# Patient Record
Sex: FEMALE | Race: WHITE | NOT HISPANIC OR LATINO | URBAN - METROPOLITAN AREA
[De-identification: names, ages, dates, MRNs, and addresses within clinical notes are randomized per-mention and may not be internally consistent; named-entity substitution may affect disease eponyms.]

---

## 2017-03-28 ENCOUNTER — OFFICE VISIT - HEALTHEAST (OUTPATIENT)
Dept: INTERNAL MEDICINE | Facility: CLINIC | Age: 32
End: 2017-03-28

## 2017-03-28 DIAGNOSIS — Z00.00 ANNUAL PHYSICAL EXAM: ICD-10-CM

## 2017-03-28 DIAGNOSIS — K58.9 IRRITABLE BOWEL SYNDROME (IBS): ICD-10-CM

## 2017-03-28 ASSESSMENT — MIFFLIN-ST. JEOR: SCORE: 1302.66

## 2017-06-22 ENCOUNTER — COMMUNICATION - HEALTHEAST (OUTPATIENT)
Dept: INTERNAL MEDICINE | Facility: CLINIC | Age: 32
End: 2017-06-22

## 2017-06-22 DIAGNOSIS — F32.A DEPRESSION: ICD-10-CM

## 2018-04-25 ENCOUNTER — OFFICE VISIT - HEALTHEAST (OUTPATIENT)
Dept: INTERNAL MEDICINE | Facility: CLINIC | Age: 33
End: 2018-04-25

## 2018-04-25 DIAGNOSIS — F41.1 GAD (GENERALIZED ANXIETY DISORDER): ICD-10-CM

## 2018-04-25 ASSESSMENT — MIFFLIN-ST. JEOR: SCORE: 1293.59

## 2018-06-08 ENCOUNTER — OFFICE VISIT - HEALTHEAST (OUTPATIENT)
Dept: INTERNAL MEDICINE | Facility: CLINIC | Age: 33
End: 2018-06-08

## 2018-06-08 ENCOUNTER — AMBULATORY - HEALTHEAST (OUTPATIENT)
Dept: INTERNAL MEDICINE | Facility: CLINIC | Age: 33
End: 2018-06-08

## 2018-06-08 DIAGNOSIS — F41.1 GAD (GENERALIZED ANXIETY DISORDER): ICD-10-CM

## 2018-06-08 ASSESSMENT — MIFFLIN-ST. JEOR: SCORE: 1293.59

## 2018-08-03 ENCOUNTER — OFFICE VISIT - HEALTHEAST (OUTPATIENT)
Dept: INTERNAL MEDICINE | Facility: CLINIC | Age: 33
End: 2018-08-03

## 2018-08-03 DIAGNOSIS — M76.892 HAMSTRING TENDINITIS OF LEFT THIGH: ICD-10-CM

## 2018-08-07 ENCOUNTER — OFFICE VISIT - HEALTHEAST (OUTPATIENT)
Dept: INTERNAL MEDICINE | Facility: CLINIC | Age: 33
End: 2018-08-07

## 2018-08-07 DIAGNOSIS — Z34.91 INTRAUTERINE NORMAL PREGNANCY, FIRST TRIMESTER: ICD-10-CM

## 2018-08-07 DIAGNOSIS — R19.7 DIARRHEA, UNSPECIFIED TYPE: ICD-10-CM

## 2018-08-07 ASSESSMENT — MIFFLIN-ST. JEOR: SCORE: 1289.05

## 2018-08-08 LAB
BASOPHILS # BLD AUTO: 0.1 THOU/UL (ref 0–0.2)
BASOPHILS NFR BLD AUTO: 1 % (ref 0–2)
EOSINOPHIL # BLD AUTO: 0.2 THOU/UL (ref 0–0.4)
EOSINOPHIL NFR BLD AUTO: 2 % (ref 0–6)
ERYTHROCYTE [DISTWIDTH] IN BLOOD BY AUTOMATED COUNT: 11.3 % (ref 11–14.5)
HCT VFR BLD AUTO: 39.5 % (ref 35–47)
HGB BLD-MCNC: 13.2 G/DL (ref 12–16)
LYMPHOCYTES # BLD AUTO: 1.9 THOU/UL (ref 0.8–4.4)
LYMPHOCYTES NFR BLD AUTO: 23 % (ref 20–40)
MCH RBC QN AUTO: 31.8 PG (ref 27–34)
MCHC RBC AUTO-ENTMCNC: 33.4 G/DL (ref 32–36)
MCV RBC AUTO: 95 FL (ref 80–100)
MONOCYTES # BLD AUTO: 0.8 THOU/UL (ref 0–0.9)
MONOCYTES NFR BLD AUTO: 9 % (ref 2–10)
NEUTROPHILS # BLD AUTO: 5.3 THOU/UL (ref 2–7.7)
NEUTROPHILS NFR BLD AUTO: 65 % (ref 50–70)
PLATELET # BLD AUTO: 268 THOU/UL (ref 140–440)
PMV BLD AUTO: 7 FL (ref 7–10)
RBC # BLD AUTO: 4.15 MILL/UL (ref 3.8–5.4)
WBC: 8.2 THOU/UL (ref 4–11)

## 2018-08-10 ENCOUNTER — COMMUNICATION - HEALTHEAST (OUTPATIENT)
Dept: INTERNAL MEDICINE | Facility: CLINIC | Age: 33
End: 2018-08-10

## 2018-08-10 ENCOUNTER — AMBULATORY - HEALTHEAST (OUTPATIENT)
Dept: INTERNAL MEDICINE | Facility: CLINIC | Age: 33
End: 2018-08-10

## 2018-08-13 ENCOUNTER — THERAPY VISIT (OUTPATIENT)
Dept: PHYSICAL THERAPY | Facility: CLINIC | Age: 33
End: 2018-08-13
Payer: COMMERCIAL

## 2018-08-13 DIAGNOSIS — M25.562 CHRONIC PAIN OF LEFT KNEE: Primary | ICD-10-CM

## 2018-08-13 DIAGNOSIS — G89.29 CHRONIC PAIN OF LEFT KNEE: Primary | ICD-10-CM

## 2018-08-13 PROCEDURE — 97110 THERAPEUTIC EXERCISES: CPT | Mod: GP | Performed by: PHYSICAL THERAPIST

## 2018-08-13 PROCEDURE — 97161 PT EVAL LOW COMPLEX 20 MIN: CPT | Mod: GP | Performed by: PHYSICAL THERAPIST

## 2018-08-13 PROCEDURE — 97530 THERAPEUTIC ACTIVITIES: CPT | Mod: GP | Performed by: PHYSICAL THERAPIST

## 2018-08-13 ASSESSMENT — ACTIVITIES OF DAILY LIVING (ADL)
PAIN: THE SYMPTOM AFFECTS MY ACTIVITY SLIGHTLY
HOW_WOULD_YOU_RATE_THE_CURRENT_FUNCTION_OF_YOUR_KNEE_DURING_YOUR_USUAL_DAILY_ACTIVITIES_ON_A_SCALE_FROM_0_TO_100_WITH_100_BEING_YOUR_LEVEL_OF_KNEE_FUNCTION_PRIOR_TO_YOUR_INJURY_AND_0_BEING_THE_INABILITY_TO_PERFORM_ANY_OF_YOUR_USUAL_DAILY_ACTIVITIES?: 50
LIMPING: I DO NOT HAVE THE SYMPTOM
STAND: ACTIVITY IS NOT DIFFICULT
GIVING WAY, BUCKLING OR SHIFTING OF KNEE: I DO NOT HAVE THE SYMPTOM
HOW_WOULD_YOU_RATE_THE_OVERALL_FUNCTION_OF_YOUR_KNEE_DURING_YOUR_USUAL_DAILY_ACTIVITIES?: ABNORMAL
SIT WITH YOUR KNEE BENT: I AM UNABLE TO DO THE ACTIVITY
KNEE_ACTIVITY_OF_DAILY_LIVING_SUM: 55
GO DOWN STAIRS: ACTIVITY IS MINIMALLY DIFFICULT
GO UP STAIRS: ACTIVITY IS MINIMALLY DIFFICULT
KNEEL ON THE FRONT OF YOUR KNEE: ACTIVITY IS SOMEWHAT DIFFICULT
WALK: ACTIVITY IS NOT DIFFICULT
STIFFNESS: THE SYMPTOM AFFECTS MY ACTIVITY SLIGHTLY
RAW_SCORE: 55
SQUAT: ACTIVITY IS MINIMALLY DIFFICULT
WEAKNESS: I DO NOT HAVE THE SYMPTOM
RISE FROM A CHAIR: ACTIVITY IS NOT DIFFICULT
KNEE_ACTIVITY_OF_DAILY_LIVING_SCORE: 78.57
SWELLING: I HAVE THE SYMPTOM BUT IT DOES NOT AFFECT MY ACTIVITY
AS_A_RESULT_OF_YOUR_KNEE_INJURY,_HOW_WOULD_YOU_RATE_YOUR_CURRENT_LEVEL_OF_DAILY_ACTIVITY?: ABNORMAL

## 2018-08-13 NOTE — PROGRESS NOTES
[facility] Initial Evaluation  Physical Therapy Initial Examination/Evaluation    August 13, 2018    Adrienne Portillo  is a 33 year old  female referred to physical therapy by Dr. Valentin for treatment of hamstring tendonitis.      DOI/onset 6 months ago  Mechanism of injury chronic changes. I believe it is a running injury. The pain was manageable at first but in the past couple months it has progressively worsened to the point where I stopped running   DOS none  Prior treatment ice. Effect of prior treatment fair    Chief Complaint:  Knee pain .   Pain location: posterior knee, anytime I bend my knee   Quality: achy overall, with  occasional sharpness   Constant/Intermittent: intermittent  Symptoms have increased since onset.    Time of Day: non-dependent   Current pain 3/10.  Pain at best 2/10.  Pain at worst 7/10.    Symptoms aggravated by running, sitting cross legged, stairs   Symptoms improved with rest     Social history:  Runner,  Enjoys spin class and other fitness classes. Regularly attends the gym, cardio and strength training.   with 2 step kids.  Occupation: .  Job duties:  Prolonged sitting, computer work.    Patient having difficulty with ADLs: sitting, walking stairs    Patient's goals are return to running     Patient reports general health as excellent.    Related medical history currently pregnant, 10 weeks, first pregnancy   Surgical History:  3 L ankle surgeries .    Imaging: none  Medications:  Anti-depressants.       Outcome measure:  KOS  Return to MD:  As needed.      Clinical Impression: Adrienne presents to [facility] with primary complains of L knee pain. Per clinical examination findings consistent with hamstring tendonopathy.  Pt demonstrates decreased ROM and muscular strength, impaired balance with increased pain through the posterior knee.  Patient responded well to eccentric exercises today in clinic.  To continue per plan of care outlined  below.      Observation:  - L foot held anterior   - slight flexion of L LE  - equal Iliac crest height   - pes planus R>L   - (+) Trendelenburg R>L    Squat:  - weight shift to the R  SL squat:  - Moderate femoral add/IR,  R>L  - patient reports no pain    MMT:  Hip flexion: 5/5 B  Knee extension: 5/5 B  Knee flexion: L 4+/5 R 5/5   ER hip: 4+/5 B  IR hip: 5-/5 L, 5/5 R  Dorsiflexion: 5/5 B    ROM:  Knee extension: 2 degrees R, 1 degrees L   Knee flexion: 137 degrees R, 140 degrees L  L knee flexion pain behind the posterior aspect of the knee -pt reports it feels like it's being compressed    Ankle Dorsiflexion: 90 degrees R, 85 degrees L  Ankle Plantarflexion: 80 degrees R, 78 degrees L     Special Tests:  (+) mcMurrys L   (- ) hip screen   (-) FADIR, passive hip flexion, SLR, JO-ANN (reproduced her knee pain)      Palpation: Mod-max tenderness distal HS on the L       HPI                    Objective:  System    Physical Exam    General     ROS    Assessment/Plan:    Patient is a 33 year old female with left side knee complaints.    Patient has the following significant findings with corresponding treatment plan.                Diagnosis 1:  L knee pain  Pain -  hot/cold therapy, US, electric stimulation, manual therapy, splint/taping/bracing/orthotics, self management, education and home program  Decreased ROM/flexibility - manual therapy and therapeutic exercise  Decreased joint mobility - manual therapy and therapeutic exercise  Decreased strength - therapeutic exercise and therapeutic activities  Impaired balance - neuro re-education and therapeutic activities  Impaired muscle performance - neuro re-education  Decreased function - therapeutic activities    Therapy Evaluation Codes:   1) History comprised of:   Personal factors that impact the plan of care:      None.    Comorbidity factors that impact the plan of care are:      Pregnancy.     Medications impacting care: Anti-depressant.  2) Examination of  Body Systems comprised of:   Body structures and functions that impact the plan of care:      Ankle and Knee.   Activity limitations that impact the plan of care are:      Bending, Jumping, Running, Sitting, Squatting/kneeling, Stairs and Walking.  3) Clinical presentation characteristics are:   Evolving/Changing.  4) Decision-Making    Low complexity using standardized patient assessment instrument and/or measureable assessment of functional outcome.  Cumulative Therapy Evaluation is: Low complexity.    Previous and current functional limitations:  (See Goal Flow Sheet for this information)    Short term and Long term goals: (See Goal Flow Sheet for this information)     Communication ability:  Patient appears to be able to clearly communicate and understand verbal and written communication and follow directions correctly.  Treatment Explanation - The following has been discussed with the patient:   RX ordered/plan of care  Anticipated outcomes  Possible risks and side effects  This patient would benefit from PT intervention to resume normal activities.   Rehab potential is good.    Frequency:  1 X week, once daily  Duration:  for 12 weeks  Discharge Plan:  Achieve all LTG.  Independent in home treatment program.  Reach maximal therapeutic benefit.    Please refer to the daily flowsheet for treatment today, total treatment time and time spent performing 1:1 timed codes.

## 2018-08-13 NOTE — LETTER
[facility] PHYSICAL THERAPY   [address] [address]  [phone]    2018    Re: Adrienne Portillo   :   1985  MRN:  [id number]   REFERRING PHYSICIAN:   Hank Valentin    [facility] PHYSICAL THERAPY  Date of Initial Evaluation:  2018  Visits:  Rxs Used: 1  Reason for Referral:  Chronic pain of left knee    EVALUATION SUMMARY    [facility]  Physical Therapy Initial Examination/Evaluation    2018    Adrienne Portillo  is a 33 year old  female referred to physical therapy by Dr. Valentin for treatment of hamstring tendonitis.    DOI/onset 6 months ago  Mechanism of injury chronic changes. I believe it is a running injury. The pain was manageable at first but in the past couple months it has progressively worsened to the point where I stopped running   DOS none  Prior treatment ice. Effect of prior treatment fair  Chief Complaint:  Knee pain .   Pain location: posterior knee, anytime I bend my knee   Quality: achy overall, with  occasional sharpness   Constant/Intermittent: intermittent  Symptoms have increased since onset.    Time of Day: non-dependent   Current pain 3/10.  Pain at best 2/10.  Pain at worst 7/10.    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 Regularly attends the gym, cardio and strength training.   with 2 step kids.  Occupation: .  Job duties:  Prolonged sitting, computer work.    Patient having difficulty with ADLs: sitting, walking stairs    Patient's goals are return to running  Patient reports general health as excellent.    Related medical history currently pregnant, 10 weeks, first pregnancy   Surgical History:  3 L ankle surgeries .    Imaging: none  Medications:  Anti-depressants.    Outcome measure:   KOS  Return to MD:  As needed.    Clinical Impression: Adrienne presents to [facility] with primary complains of L knee pain. Per clinical examination findings consistent with hamstring tendonopathy.  Pt demonstrates decreased ROM and muscular strength, impaired balance with increased pain through the posterior knee.  Patient responded well to eccentric exercises today in clinic.  To continue per plan of care outlined below.      Observation:  - L foot held anterior   - slight flexion of L LE  - equal Iliac crest height   - pes planus R>L   - (+) Trendelenburg R>L      Re: Adrienne Portillo   :   1985  MRN:  [id number]  Squat:  - weight shift to the R  SL squat:  - Moderate femoral add/IR,  R>L  - patient reports no pain  MMT:  Hip flexion: 5/5 B  Knee extension: 5/5 B  Knee flexion: L 4+/5 R 5/5   ER hip: 4+/5 B  IR hip: 5-/5 L, 5/5 R  Dorsiflexion: 5/5 B  ROM:  Knee extension: 2 degrees R, 1 degrees L   Knee flexion: 137 degrees R, 140 degrees L  L knee flexion pain behind the posterior aspect of the knee -pt reports it feels like it's being compressed  Ankle Dorsiflexion: 90 degrees R, 85 degrees L  Ankle Plantarflexion: 80 degrees R, 78 degrees L     Special Tests:  (+) mcMurrys L   (- ) hip screen   (-) FADIR, passive hip flexion, SLR, JO-ANN (reproduced her knee pain)  Palpation: Mod-max tenderness distal HS on the L     Assessment/Plan:    Patient is a 33 year old female with left side knee complaints.    Patient has the following significant findings with corresponding treatment plan.                Diagnosis 1:  L knee pain  Pain -  hot/cold therapy, US, electric stimulation, manual therapy, splint/taping/bracing/orthotics, self management, education and home program  Decreased ROM/flexibility - manual therapy and therapeutic exercise  Decreased joint mobility - manual therapy and therapeutic exercise  Decreased strength - therapeutic exercise and therapeutic activities  Impaired balance - neuro  re-education and therapeutic activities  Impaired muscle performance - neuro re-education  Decreased function - therapeutic activities    Therapy Evaluation Codes:   1) History comprised of:   Personal factors that impact the plan of care:      None.    Comorbidity factors that impact the plan of care are:      Pregnancy.     Medications impacting care: Anti-depressant.  2) Examination of Body Systems comprised of:   Body structures and functions that impact the plan of care:      Ankle and Knee.   Activity limitations that impact the plan of care are:      Bending, Jumping, Running, Sitting, Squatting/kneeling, Stairs and Walking.  3) Clinical presentation characteristics are:   Evolving/Changing.  4) Decision-Making    Low complexity using standardized patient assessment instrument and/or measureable assessment of functional outcome.  Cumulative Therapy Evaluation is: Low complexity.    Previous and current functional limitations:  (See Goal Flow Sheet for this information)    Short term and Long term goals: (See Goal Flow Sheet for this information)     Communication ability:  Patient appears to be able to clearly communicate and understand verbal and written communication and follow directions correctly.  Treatment Explanation - The following has been discussed with the patient:     Re: Adrienne LISANDRO Bandar   :   1985  MRN:  [id number]    RX ordered/plan of care  Anticipated outcomes  Possible risks and side effects  This patient would benefit from PT intervention to resume normal activities.   Rehab potential is good.    Frequency:  1 X week, once daily  Duration:  for 12 weeks  Discharge Plan:  Achieve all LTG.  Independent in home treatment program.  Reach maximal therapeutic benefit.    Thank you for your referral.    INQUIRIES  Therapist: Yu Bonilla, PT, DPT, [facility]  [facility] PHYSICAL THERAP[facility]  [address] [place] [id number]  Phone: [phone]  Fax:  [phone]

## 2018-08-13 NOTE — MR AVS SNAPSHOT
After Visit Summary   8/13/2018    Adrienne Portillo    MRN: [id number]           Patient Information     Date Of Birth          1985        Visit Information        Provider Department      8/13/2018 8:10 AM Yu Bonilla, PT [facility] Physical [facility]        Today's Diagnoses     Chronic pain of left knee    -  1       Follow-ups after your visit        Your next 10 appointments already scheduled     Aug 20, 2018  3:20 PM CDT   SHANNON Extremity with Fely Salas, PTA   [facility] Physical Therapy ([facility]  )    [address] [place] [address] [phone]            Aug 28, 2018  4:00 PM CDT   SHANNON Extremity with Fidelina Tom, PT   [facility] Physical [facility] ([facility]  )    [address] [place] [address] [phone]              Who to contact     If you have questions or need follow up information about today's clinic visit or your schedule please contact [facility] PHYSICAL THERAPY directly at [phone].  Normal or non-critical lab and imaging results will be communicated to you by MyChart, letter or phone within 4 business days after the clinic has received the results. If you do not hear from us within 7 days, please contact the clinic through MyChart or phone. If you have a critical or abnormal lab result, we will notify you by phone as soon as possible.  Submit refill requests through [employer] or call your pharmacy and they will forward the refill request to us. Please allow 3 business days for your refill to be completed.          Additional Information About Your Visit        Care EveryWhere ID     This is your Care EveryWhere ID. This could be used by other organizations to access your [facility] medical records  [id number]         Blood Pressure from Last 3 Encounters:   08/18/13 106/64   08/03/13  118/60   03/04/12 112/60    Weight from Last 3 Encounters:   08/18/13 61.2 kg (135 lb)   08/03/13 61.2 kg (135 lb)   03/04/12 61.2 kg (135 lb)              We Performed the Following     SHANNON Inital Eval Report     PT Eval, Low Complexity (42622)     Therapeutic Activities     Therapeutic Exercises        Primary Care Provider Office Phone # Fax #    Dada Gaitan [phone]       [address]  [facility] [address]        Equal Access to Services     [facility]: Hadii aad ku hadasho Soomaali, waaxda luqadaha, qaybta kaalmada adeamarjityawendi, stevenson antonio . So [facility] [phone].    ATENCIÓN: Si habla español, tiene a arias disposición servicios gratuitos de asistencia lingüística. Ll[facility] [phone].    We comply with applicable federal civil rights laws and [place] laws. We do not discriminate on the basis of race, color, national origin, age, disability, sex, sexual orientation, or gender identity.            Thank you!     Thank you for choosing [place] [facility] PHYSICAL THERAPY  for your care. Our goal is always to provide you with excellent care. Hearing back from our patients is one way we can continue to improve our services. Please take a few minutes to complete the written survey that you may receive in the mail after your visit with us. Thank you!             Your Updated Medication List - Protect others around you: Learn how to safely use, store and throw away your medicines at www.disposemymeds.org.          This list is accurate as of 8/13/18  2:35 PM.  Always use your most recent med list.                   Brand Name Dispense Instructions for use Diagnosis    PAXIL PO      Take  by mouth.        PERCY 3-0.02 MG per tablet   Generic drug:  drospirenone-ethinyl estradiol      Take 1 tablet by mouth daily.

## 2018-08-21 ENCOUNTER — THERAPY VISIT (OUTPATIENT)
Dept: PHYSICAL THERAPY | Facility: CLINIC | Age: 33
End: 2018-08-21
Payer: COMMERCIAL

## 2018-08-21 DIAGNOSIS — M25.562 CHRONIC PAIN OF LEFT KNEE: ICD-10-CM

## 2018-08-21 DIAGNOSIS — G89.29 CHRONIC PAIN OF LEFT KNEE: ICD-10-CM

## 2018-08-21 PROCEDURE — 97140 MANUAL THERAPY 1/> REGIONS: CPT | Mod: GP | Performed by: PHYSICAL THERAPIST

## 2018-08-21 PROCEDURE — 97112 NEUROMUSCULAR REEDUCATION: CPT | Mod: GP | Performed by: PHYSICAL THERAPIST

## 2018-08-28 ENCOUNTER — THERAPY VISIT (OUTPATIENT)
Dept: PHYSICAL THERAPY | Facility: CLINIC | Age: 33
End: 2018-08-28
Payer: COMMERCIAL

## 2018-08-28 DIAGNOSIS — M25.562 CHRONIC PAIN OF LEFT KNEE: ICD-10-CM

## 2018-08-28 DIAGNOSIS — G89.29 CHRONIC PAIN OF LEFT KNEE: ICD-10-CM

## 2018-08-28 PROCEDURE — 97110 THERAPEUTIC EXERCISES: CPT | Mod: GP | Performed by: PHYSICAL THERAPIST

## 2018-08-28 PROCEDURE — 97140 MANUAL THERAPY 1/> REGIONS: CPT | Mod: GP | Performed by: PHYSICAL THERAPIST

## 2018-08-28 PROCEDURE — 97112 NEUROMUSCULAR REEDUCATION: CPT | Mod: GP | Performed by: PHYSICAL THERAPIST

## 2018-08-28 NOTE — PROGRESS NOTES
Subjective:  HPI                    Objective:  System    Physical Exam    General     ROS    Assessment/Plan:    SUBJECTIVE  Subjective changes as noted by pt:   Pt. reports steady progress with decreasing L knee pain. She feels like she is ready to start testing running again in the next week.   Current pain level:  2/10   Changes in function:  Yes (See Goal flowsheet attached for changes in current functional level)     Adverse reaction to treatment or activity:  None    OBJECTIVE  Changes in objective findings:   Strength L hip flex 5/5; abd 4/5; ext 4+/5; glut med 4+/5     ASSESSMENT  Adrienne continues to require intervention to meet STG and LTG's: PT  Patient's symptoms are resolving.  Response to therapy has shown an improvement in  pain level and strength  Progress made towards STG/LTG?  Yes (See Goal flowsheet attached for updates on achievement of STG and LTG)    PLAN  Current treatment program is being advanced to more complex exercises.    PTA/ATC plan:  N/A    Please refer to the daily flowsheet for treatment today, total treatment time and time spent performing 1:1 timed codes.

## 2018-08-28 NOTE — MR AVS SNAPSHOT
After Visit Summary   8/28/2018    Adrienne Portillo    MRN: [id number]           Patient Information     Date Of Birth          1985        Visit Information        Provider Department      8/28/2018 4:00 PM Fidelina Tom, PT [facility] Physical Therapy        Today's Diagnoses     Chronic pain of left knee           Follow-ups after your visit        Your next 10 appointments already scheduled     Sep 10, 2018  7:30 AM CDT   SHANNON Extremity with Yu Bonilla PT   [facility] Physical Therapy ([facility]  )    [address] [place] [address] [phone]            Sep 17, 2018  7:30 AM CDT   SHANNON Extremity with Yu Bonilla PT   [facility] Physical Therapy ([address] [address] [phone]              Who to contact     If you have questions or need follow up information about today's clinic visit or your schedule please contact [facility] PHYSICAL THERAPY directly at [phone].  Normal or non-critical lab and imaging results will be communicated to you by MyChart, letter or phone within 4 business days after the clinic has received the results. If you do not hear from us within 7 days, please contact the clinic through MyChart or phone. If you have a critical or abnormal lab result, we will notify you by phone as soon as possible.  Submit refill requests through [employer] or call your pharmacy and they will forward the refill request to us. Please allow 3 business days for your refill to be completed.          Additional Information About Your Visit        Care EveryWhere ID     This is your Care EveryWhere ID. This could be used by other organizations to access your [facility] [place] medical records  [id number]         Blood Pressure from Last 3 Encounters:   08/18/13 106/64   08/03/13 118/60    03/04/12 112/60    Weight from Last 3 Encounters:   08/18/13 61.2 kg (135 lb)   08/03/13 61.2 kg (135 lb)   03/04/12 61.2 kg (135 lb)              We Performed the Following     Manual Ther Tech, 1+Regions, EA 15 min     Neuromuscular Re-Education     Therapeutic Exercises        Primary Care Provider Office Phone # Fax #    Dada Gaitan [phone]       [facility] [address] [facility] [address]  [facility] [address]        Equal Access to Services     [facility]: Hadii aad ku hadasho Soomaali, waaxda luqadaha, qaybta kaalmada adeegyada, waxay jacintoin hayjose antonio . So [facility] [phone].    ATENCIÓN: Si habla español, tiene a arias disposición servicios gratuitos de asistencia lingüística. LlAs[facility]ab[facility] [phone].    We comply with applicable federal civil rights laws and [place] laws. We do not discriminate on the basis of race, color, national origin, age, disability, sex, sexual orientation, or gender identity.            Thank you!     Thank you for choosing [place] [facility]IC [facility]IC[facility] PHYSICAL THERAPY  for your care. Our goal is always to provide you with excellent care. Hearing back from our patients is one way we can continue to improve our services. Please take a few minutes to complete the written survey that you may receive in the mail after your visit with us. Thank you!             Your Updated Medication List - Protect others around you: Learn how to safely use, store and throw away your medicines at www.disposemymeds.org.          This list is accurate as of 8/28/18  5:57 PM.  Always use your most recent med list.                   Brand Name Dispense Instructions for use Diagnosis    PAXIL PO      Take  by mouth.        PERCY 3-0.02 MG per tablet   Generic drug:  drospirenone-ethinyl estradiol      Take 1 tablet by mouth daily.

## 2018-09-10 ENCOUNTER — THERAPY VISIT (OUTPATIENT)
Dept: PHYSICAL THERAPY | Facility: CLINIC | Age: 33
End: 2018-09-10
Payer: COMMERCIAL

## 2018-09-10 DIAGNOSIS — M25.562 CHRONIC PAIN OF LEFT KNEE: ICD-10-CM

## 2018-09-10 DIAGNOSIS — G89.29 CHRONIC PAIN OF LEFT KNEE: ICD-10-CM

## 2018-09-10 PROCEDURE — 97140 MANUAL THERAPY 1/> REGIONS: CPT | Mod: GP | Performed by: PHYSICAL THERAPIST

## 2018-09-10 PROCEDURE — 97530 THERAPEUTIC ACTIVITIES: CPT | Mod: GP | Performed by: PHYSICAL THERAPIST

## 2018-09-10 PROCEDURE — 97110 THERAPEUTIC EXERCISES: CPT | Mod: GP | Performed by: PHYSICAL THERAPIST

## 2018-09-17 ENCOUNTER — THERAPY VISIT (OUTPATIENT)
Dept: PHYSICAL THERAPY | Facility: CLINIC | Age: 33
End: 2018-09-17
Payer: COMMERCIAL

## 2018-09-17 DIAGNOSIS — G89.29 CHRONIC PAIN OF LEFT KNEE: ICD-10-CM

## 2018-09-17 DIAGNOSIS — M25.562 CHRONIC PAIN OF LEFT KNEE: ICD-10-CM

## 2018-09-17 PROCEDURE — 97140 MANUAL THERAPY 1/> REGIONS: CPT | Mod: GP | Performed by: PHYSICAL THERAPIST

## 2018-09-17 PROCEDURE — 97110 THERAPEUTIC EXERCISES: CPT | Mod: GP | Performed by: PHYSICAL THERAPIST

## 2018-10-04 ENCOUNTER — THERAPY VISIT (OUTPATIENT)
Dept: PHYSICAL THERAPY | Facility: CLINIC | Age: 33
End: 2018-10-04
Payer: COMMERCIAL

## 2018-10-04 DIAGNOSIS — G89.29 CHRONIC PAIN OF LEFT KNEE: ICD-10-CM

## 2018-10-04 DIAGNOSIS — M25.562 CHRONIC PAIN OF LEFT KNEE: ICD-10-CM

## 2018-10-04 PROCEDURE — 97110 THERAPEUTIC EXERCISES: CPT | Mod: GP | Performed by: PHYSICAL THERAPIST

## 2018-10-04 PROCEDURE — 97016 VASOPNEUMATIC DEVICE THERAPY: CPT | Mod: GP | Performed by: PHYSICAL THERAPIST

## 2018-10-04 PROCEDURE — 97140 MANUAL THERAPY 1/> REGIONS: CPT | Mod: GP | Performed by: PHYSICAL THERAPIST

## 2018-10-04 ASSESSMENT — ACTIVITIES OF DAILY LIVING (ADL)
GIVING WAY, BUCKLING OR SHIFTING OF KNEE: I DO NOT HAVE THE SYMPTOM
WEAKNESS: I DO NOT HAVE THE SYMPTOM
PAIN: I HAVE THE SYMPTOM BUT IT DOES NOT AFFECT MY ACTIVITY
LIMPING: I DO NOT HAVE THE SYMPTOM
RISE FROM A CHAIR: ACTIVITY IS NOT DIFFICULT
SIT WITH YOUR KNEE BENT: ACTIVITY IS MINIMALLY DIFFICULT
STAND: ACTIVITY IS NOT DIFFICULT
WALK: ACTIVITY IS NOT DIFFICULT
RAW_SCORE: 67
KNEEL ON THE FRONT OF YOUR KNEE: ACTIVITY IS NOT DIFFICULT
SWELLING: I DO NOT HAVE THE SYMPTOM
HOW_WOULD_YOU_RATE_THE_CURRENT_FUNCTION_OF_YOUR_KNEE_DURING_YOUR_USUAL_DAILY_ACTIVITIES_ON_A_SCALE_FROM_0_TO_100_WITH_100_BEING_YOUR_LEVEL_OF_KNEE_FUNCTION_PRIOR_TO_YOUR_INJURY_AND_0_BEING_THE_INABILITY_TO_PERFORM_ANY_OF_YOUR_USUAL_DAILY_ACTIVITIES?: 90
KNEE_ACTIVITY_OF_DAILY_LIVING_SUM: 67
GO UP STAIRS: ACTIVITY IS NOT DIFFICULT
STIFFNESS: I HAVE THE SYMPTOM BUT IT DOES NOT AFFECT MY ACTIVITY
HOW_WOULD_YOU_RATE_THE_OVERALL_FUNCTION_OF_YOUR_KNEE_DURING_YOUR_USUAL_DAILY_ACTIVITIES?: NEARLY NORMAL
SQUAT: ACTIVITY IS NOT DIFFICULT
AS_A_RESULT_OF_YOUR_KNEE_INJURY,_HOW_WOULD_YOU_RATE_YOUR_CURRENT_LEVEL_OF_DAILY_ACTIVITY?: NEARLY NORMAL
GO DOWN STAIRS: ACTIVITY IS NOT DIFFICULT
KNEE_ACTIVITY_OF_DAILY_LIVING_SCORE: 95.71

## 2019-01-11 ENCOUNTER — RECORDS - HEALTHEAST (OUTPATIENT)
Dept: ADMINISTRATIVE | Facility: OTHER | Age: 34
End: 2019-01-11

## 2019-02-22 ENCOUNTER — HOME CARE/HOSPICE - HEALTHEAST (OUTPATIENT)
Dept: HOME HEALTH SERVICES | Facility: HOME HEALTH | Age: 34
End: 2019-02-22

## 2019-02-24 ENCOUNTER — HOME CARE/HOSPICE - HEALTHEAST (OUTPATIENT)
Dept: HOME HEALTH SERVICES | Facility: HOME HEALTH | Age: 34
End: 2019-02-24

## 2019-02-25 ENCOUNTER — COMMUNICATION - HEALTHEAST (OUTPATIENT)
Dept: ADMINISTRATIVE | Facility: CLINIC | Age: 34
End: 2019-02-25

## 2019-02-26 ENCOUNTER — RECORDS - HEALTHEAST (OUTPATIENT)
Dept: ADMINISTRATIVE | Facility: OTHER | Age: 34
End: 2019-02-26

## 2019-02-28 ENCOUNTER — COMMUNICATION - HEALTHEAST (OUTPATIENT)
Dept: MIDWIFE SERVICES | Facility: CLINIC | Age: 34
End: 2019-02-28

## 2019-03-05 ENCOUNTER — RECORDS - HEALTHEAST (OUTPATIENT)
Dept: ADMINISTRATIVE | Facility: OTHER | Age: 34
End: 2019-03-05

## 2019-03-12 ENCOUNTER — RECORDS - HEALTHEAST (OUTPATIENT)
Dept: ADMINISTRATIVE | Facility: OTHER | Age: 34
End: 2019-03-12

## 2019-03-20 ENCOUNTER — COMMUNICATION - HEALTHEAST (OUTPATIENT)
Dept: MIDWIFE SERVICES | Facility: CLINIC | Age: 34
End: 2019-03-20

## 2019-03-28 ENCOUNTER — COMMUNICATION - HEALTHEAST (OUTPATIENT)
Dept: MIDWIFE SERVICES | Facility: CLINIC | Age: 34
End: 2019-03-28

## 2019-12-16 ENCOUNTER — OFFICE VISIT - HEALTHEAST (OUTPATIENT)
Dept: INTERNAL MEDICINE | Facility: CLINIC | Age: 34
End: 2019-12-16

## 2019-12-16 DIAGNOSIS — M25.561 ACUTE PAIN OF RIGHT KNEE: ICD-10-CM

## 2021-05-26 VITALS — DIASTOLIC BLOOD PRESSURE: 58 MMHG | SYSTOLIC BLOOD PRESSURE: 106 MMHG | HEART RATE: 56 BPM

## 2021-05-30 VITALS — HEIGHT: 65 IN | BODY MASS INDEX: 22.33 KG/M2 | WEIGHT: 134 LBS

## 2021-06-01 VITALS — BODY MASS INDEX: 21.63 KG/M2 | WEIGHT: 131 LBS

## 2021-06-01 VITALS — WEIGHT: 132 LBS | HEIGHT: 65 IN | BODY MASS INDEX: 21.99 KG/M2

## 2021-06-01 VITALS — BODY MASS INDEX: 21.83 KG/M2 | WEIGHT: 131 LBS | HEIGHT: 65 IN

## 2021-06-01 VITALS — BODY MASS INDEX: 21.99 KG/M2 | WEIGHT: 132 LBS | HEIGHT: 65 IN

## 2021-06-02 VITALS — HEIGHT: 68 IN | BODY MASS INDEX: 20.65 KG/M2 | WEIGHT: 135.8 LBS | BODY MASS INDEX: 22.6 KG/M2

## 2021-06-02 VITALS — BODY MASS INDEX: 23.8 KG/M2 | HEIGHT: 65 IN

## 2021-06-02 VITALS — WEIGHT: 138 LBS | BODY MASS INDEX: 22.96 KG/M2

## 2021-06-02 VITALS — WEIGHT: 143 LBS | BODY MASS INDEX: 26.13 KG/M2 | BODY MASS INDEX: 23.8 KG/M2 | HEIGHT: 65 IN

## 2021-06-04 NOTE — PROGRESS NOTES
ASSESSMENT and PLAN:    #1.  Right knee pain likely secondary to some mild hamstring tendinitis.  Again she wanted to make sure that her knee was structurally stable before she was to resume exercise.  I reassured her that I did not see any ligamentous injury or laxity nor any meniscal pathology.  She will follow-up with us as needed.    Problem List Items Addressed This Visit     None      Visit Diagnoses     Acute pain of right knee    -  Primary          There are no Patient Instructions on file for this visit.    There are no discontinued medications.    No follow-ups on file.    CHIEF COMPLAINT:  Chief Complaint   Patient presents with     Knee Pain     right - (saw you last year for left knee) - x4-6 weeks - fell while running 2 months ago        HISTORY OF PRESENT ILLNESS:  Adrienne Portillo is a 34 y.o. female  presenting to the clinic today for evaluation of right knee pain.  She states that this is been going on for about the last 4 to 6 weeks.  She feels some mild pain in the posterior aspect of the knee with some pain also in the medial aspect of the knee.  She states that it gets worse when she flexes the knee.  She denies any swelling.  This had been limiting her ability to run and she would like to get back into about came in today to make sure that there was nothing structurally wrong with her knee before she would start exercising again.  She fell while running about 2 months ago, falling directly on her right patella.    REVIEW OF SYSTEMS:   Pertinent positives noted in HPI, remainder of ROS is negative.    MEDICATIONS:  Current Outpatient Medications   Medication Sig Dispense Refill     cephalexin (KEFLEX) 250 MG capsule Take 1 capsule (250 mg total) by mouth as needed (Take 1 tablet after intercourse). 20 capsule 3     prenatal 21/iron fu/folic acid (PRENATAL COMPLETE ORAL) Take 1 tablet by mouth daily.       sertraline (ZOLOFT) 50 MG tablet Take 50 mg by mouth daily.  3     No current  facility-administered medications for this visit.        TOBACCO USE:  Social History     Tobacco Use   Smoking Status Never Smoker   Smokeless Tobacco Never Used       VITALS:  Vitals:    12/16/19 1405   BP: 106/58   Pulse: (!) 56     Wt Readings from Last 3 Encounters:   03/12/19 135 lb 12.8 oz (61.6 kg)   03/05/19 138 lb (62.6 kg)   02/26/19 143 lb (64.9 kg)         PHYSICAL EXAM:  Constitutional:  Reveals an alert, pleasant female.   HEET: Normocephalic, without obvious abnormality, atraumatic. PERRL, conjunctiva/corneas clear, EOM's intact. External canals, TMs clear.   Neurologic: Normal gait and station  Psychologic: Normal affect  Skill skeletal: Right knee range of motion is 0 140 degrees.  There is no effusion present.  No medial or lateral joint line tenderness.  Lockman's is negative, no pain or laxity to varus or valgus stress.  Shayla's is negative.  Anterior and posterior drawer is negative.

## 2021-06-09 NOTE — PROGRESS NOTES
Office Visit - Physical   Adrienne Portillo   32 y.o.  female    Date of visit: 3/28/2017  Physician: Dada Gaitan MD     Assessment and Plan   {1. Annual physical exam  Overall feels very well.  Offers no other interval complaints.  Since last visit she has been  now for the past 6 months.  Her  also works at the [employer] in a different department.    2. Irritable bowel syndrome (IBS)  This is been evaluated in the past with negative gluten serology.  She however has gone gluten-free and thinks this helps she will continue this on an as-needed basis.          No Follow-up on file.     Chief Complaint   Chief Complaint   Patient presents with     Annual Exam     Not fasting - Check up        Patient Profile   Social History     Social History Narrative        Past Medical History   Patient Active Problem List   Diagnosis     Hirsutism     Anxiety     Irritable bowel syndrome (IBS)       Past Surgical History  She has a history of ankle surgeries ×3 in the past otherwise no surgeries      History of Present Illness   This 32 y.o. old comes in for an annual physical exam.  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  Her  also works at [employer].  They have been  for 6 months.  She remains on Paxil and this provides her good control of her anxiety with depression she like to continue with this.  She is on Morena birth control pills and this is through her obstetrician.  She does complain that she has had cold symptoms for the past 2-3 days with a stuffy nose and a cough she thinks this will resolve.  He exercises regularly and runs 3-4 times per week for 5-6 miles.    Review of Systems: A comprehensive review of systems was negative except as noted.     Medications and Allergies   Current Outpatient Prescriptions   Medication Sig Dispense Refill     cephalexin (KEFLEX) 250 MG capsule Take 250 mg by mouth as needed (Take 1 tablet after intercourse).       PARoxetine  "(PAXIL) 10 MG tablet Take 1 tablet (10 mg total) by mouth daily. 90 tablet 3     PERCY, 28, 3-0.02 mg per tablet Take 1 tablet by mouth daily.       No current facility-administered medications for this visit.      No Known Allergies     Family and Social History   Family History   Problem Relation Age of Onset     Hypertension Mother         Social History   Substance Use Topics     Smoking status: Never Smoker     Smokeless tobacco: Never Used     Alcohol use None        Physical Exam   General Appearance:       /64 (Patient Site: Right Arm, Patient Position: Sitting)  Pulse 61  Ht 5' 5.25\" (1.657 m)  Wt 134 lb (60.8 kg)  LMP 02/28/2017 (Approximate)  SpO2 98%  BMI 22.13 kg/m2    EYES: Eyelids, conjunctiva, and sclera were normal. Pupils were normal.   HEAD, EARS, NOSE, MOUTH, AND THROAT: Head and face were normal. Hearing was normal to voice and the ears were normal to external exam. Nose appearance was normal and there was no discharge. Oropharynx was normal.  NECK: Neck appearance was normal. There were no neck masses and the thyroid was not enlarged.  RESPIRATORY: Breathing pattern was normal and the chest moved symmetrically.  Percussion/auscultatory percussion was normal.  Lung sounds were normal and there were no abnormal sounds.  CARDIOVASCULAR: Heart rate and rhythm were normal.  S1 and S2 were normal and there were no extra sounds or murmurs. Peripheral pulses in arms and legs were normal.  Jugular venous pressure was normal.  There was no peripheral edema.  GASTROINTESTINAL: The abdomen was normal in contour.  Bowel sounds were present.  Percussion detected no organ enlargement or tenderness.  Palpation detected no tenderness, mass, or enlarged organs.   MUSCULOSKELETAL: Skeletal configuration was normal and muscle mass was normal for age. Joint appearance was overall normal.  LYMPHATIC: There were no enlarged nodes.  SKIN/HAIR/NAILS: Skin color was normal.  There were no skin lesions.  Hair " and nails were normal.  NEUROLOGIC: The patient was alert and oriented to person, place, time, and circumstance. Speech was normal. Cranial nerves were normal. Motor strength was normal for age. The patient was normally coordinated.  PSYCHIATRIC:  Mood and affect were normal and the patient had normal recent and remote memory. The patient's judgment and insight were normal.         Additional Information        Dada Gaitan MD  Internal Medicine  Contact me at [phone]

## 2021-06-17 NOTE — PROGRESS NOTES
Office Visit - Follow Up   Adrienne Portillo   33 y.o. female    Date of Visit: 4/25/2018    Chief Complaint   Patient presents with     Follow-up     Would like to discuus taking Paxil and wanting to get pregnant.  WAs told not to take together.  Trying to wean off and having bad side effects        Assessment and Plan   1. CALDERON (generalized anxiety disorder)  Plan is to taper off of SSRIs.  I gave her a tapering schedule Paxil 5 mg per day for weeks and 2.5 alternating with 5 mg for 2 weeks then 2.5 mg per day for 1 week then stop.  In addition she will check with her insurance and see if she could see a psychologist to help her with her CALDERON and manage flares and triggers.          No Follow-up on file.     History of Present Illness   This 33 y.o. old has been on Paxil since age 10.  At 33 years of age now she would like to get pregnant.  She is seen by Dr. Zamarripa at [facility].  She was on Paxil 10 mg per day chronically OB physician switched her from Paxil to Zoloft 25 mg per day she tried this for 1 day and she felt systemically quite ill.  SHe had worsening anxiety tingling in her hands and dreams and had a hard time functioning.  She went back to 10 mg of Paxil daily.  Now she reports that for the past week she has been trying 5 mg per day.  This also was somewhat difficult.  She had worsening anxiety with tingling in her hands and vivid dreams and occasional feelings of slight panic.  Now that she is 5 days into this she is feeling more comfortable and things are a bit more steady.  She has not been seeing any other healthcare providers for her generalized anxiety.  She is not seeing a psychologist since her  childhood.  She denies any depression.  She has good relationship with her  who is supportive.    Review of Systems: A comprehensive review of systems was negative except as noted.     Medications, Allergies and Problem List   Reviewed and updated     Physical Exam   General Appearance:    "    /76 (Patient Site: Right Arm, Patient Position: Sitting, Cuff Size: Adult Regular)  Pulse 67  Ht 5' 5.25\" (1.657 m)  Wt 132 lb (59.9 kg)  SpO2 98%  BMI 21.8 kg/m2    She is alert and oriented.  She does not appear anxious.  No tremor.  No suicidal thoughts.     Additional Information   Current Outpatient Prescriptions   Medication Sig Dispense Refill     cephalexin (KEFLEX) 250 MG capsule Take 1 capsule (250 mg total) by mouth as needed (Take 1 tablet after intercourse). 20 capsule 3     PARoxetine (PAXIL) 10 MG tablet Take 1 tablet (10 mg total) by mouth daily. (Patient taking differently: Take 5 mg by mouth daily. ) 90 tablet 3     No current facility-administered medications for this visit.      No Known Allergies  Social History   Substance Use Topics     Smoking status: Never Smoker     Smokeless tobacco: Never Used     Alcohol use None       Review and/or order of clinical lab tests:  Review and/or order of radiology tests:  Review and/or order of medicine tests:  Discussion of test results with performing physician:  Decision to obtain old records and/or obtain history from someone other than the patient:  Review and summarization of old records and/or obtaining history from someone other than the patient and.or discussion of case with another health care provider:  Independent visualization of image, tracing or specimen itself:    Time: total time spent with the patient was 15 minutes of which >50% was spent in counseling and coordination of care     Dada Gaitan MD    "

## 2021-06-18 NOTE — PROGRESS NOTES
Office Visit - Follow Up   Adrienne Portillo   33 y.o. female    Date of Visit: 6/8/2018    Chief Complaint   Patient presents with     Follow-up     Follow up,  off Paxil x 3 weeks, feels better but still feels like she needs something, (Trying to get pregnant)        Assessment and Plan   1. CALDERON (generalized anxiety disorder)  She has now been off Paxil for the past 3 weeks.  She definitely is feeling anxious but is not uncomfortable is when she was taken off Paxil.  She is sleeping okay but it is hard to fall asleep in her mind races quite a bit trying to sleep.  For the past 2 weeks now she has had some anxiety.  We had a good discussion of her current symptoms.  Talked about psychological support that may help with anxiety.  Her obstetrician recommended 50 mg of sertraline daily.  She is still trying to get pregnant but this process has just started.  After a good discussion Adrienne is going to use 25 mg of sertraline daily see how this goes for a week or 2 and increase to 50 mg daily as needed.  Our obstetricians are most comfortable with use of sertraline during pregnancy as compared to the other SSRIs.          No Follow-up on file.     History of Present Illness   This 33 y.o. old returns for follow-up with me.  At that last visit we started a tapering schedule of Paxil.  She as listed she tapered down nicely off Paxil and is been off it for the last 3 weeks.  When she got as low as 2.5 mg daily she felt a bit more anxious and had some tingling in her hands.  Now she has been off of Paxil for 3 weeks.  She still is having some anxiety over the past 2 weeks but she is modestly comfortable.  He wonders how much is Paxil withdrawal or just her ongoing underlying generalized anxiety.  I told her I think the Paxil is out of her system and this is her anxiety that she is feeling.  She is functioning well at work she has less anxiety at work.  When she stays focused on tasks he does not worry about anxiety.  She  "and her  are trying to get pregnant.  She seen her OB doctor and sertraline has been recommended mended at 50 mg per day  We did discuss other methods to control her anxiety.  We discussed also to get a better nights sleep.  From a sleep standpoint I suggested she could try melatonin first and then also try Tylenol PM.  The should be safe.  From an anxiety standpoint she could just try and ride out her  Anxiety and see if things get better.  We talked about seeing a psychologist in learning some techniques to be less anxious.  Adrienne is most interested in trying low dose of sertraline.    Review of Systems: A comprehensive review of systems was negative except as noted.     Medications, Allergies and Problem List   Reviewed and updated     Physical Exam   General Appearance:     /74 (Patient Site: Right Arm, Patient Position: Sitting, Cuff Size: Adult Regular)  Pulse (!) 59  Ht 5' 5.25\" (1.657 m)  Wt 132 lb (59.9 kg)  SpO2 99%  BMI 21.8 kg/m2    She is alert and conversant.  She does not look anxious.     Additional Information   Current Outpatient Prescriptions   Medication Sig Dispense Refill     cephalexin (KEFLEX) 250 MG capsule Take 1 capsule (250 mg total) by mouth as needed (Take 1 tablet after intercourse). 20 capsule 3     No current facility-administered medications for this visit.      No Known Allergies  Social History   Substance Use Topics     Smoking status: Never Smoker     Smokeless tobacco: Never Used     Alcohol use None       Review and/or order of clinical lab tests:  Review and/or order of radiology tests:  Review and/or order of medicine tests:  Discussion of test results with performing physician:  Decision to obtain old records and/or obtain history from someone other than the patient:  Review and summarization of old records and/or obtaining history from someone other than the patient and.or discussion of case with another health care provider:  Independent visualization " of image, tracing or specimen itself:    Time: total time spent with the patient was 15 minutes of which >50% was spent in counseling and coordination of care     Dada Gaitan MD

## 2021-06-19 NOTE — PROGRESS NOTES
"  Office Visit - Follow Up   Adrienne Portillo   33 y.o. female    Date of Visit: 8/7/2018    Chief Complaint   Patient presents with     Diarrhea     Ongoing diarrhea x 2 weeks, Pt is 9 weeks pregnant, Imodium helps but when she stops it comes right back         Assessment and Plan   1. Diarrhea, unspecified type  She has been having diarrhea for the past 2 weeks.  No serious warning signs.  Her CBC is normal today she has had good success with 1 or 2 Imodium tablets per day I suggest she could try up to 4 Imodium tablets per day.  I think it would be wise to avoid dairy products for the next week or 2.  Her exam is benign.  She has a history of irritable bowel.  - HM1(CBC and Differential)  - HM1 (CBC with Diff)    2. Intrauterine normal pregnancy, first trimester  She is 9 weeks pregnant and due date is estimated to be about March/10/2019          No Follow-up on file.     History of Present Illness   This 33 y.o. old is 9 weeks pregnant now.  She had an ultrasound showing good audible heartbeat.  These had good success with transitioning to Zoloft with little to no side effects.  About 2 weeks ago however she is back began experiencing some diarrhea.  She has not been having 2-8 watery or semisolid bowel movements per day.  We talked with her obstetrician and was told she could use some Imodium sparingly as needed.  She reports no spasms of leg.  No fevers no blood in her stool no chills.  No previous history of serious colitis.  Her obstetrician is Dr. Naranjo  Review of Systems: A comprehensive review of systems was negative except as noted.     Medications, Allergies and Problem List   Reviewed and updated     Physical Exam   General Appearance:       /66 (Patient Site: Right Arm, Patient Position: Sitting, Cuff Size: Adult Regular)  Pulse (!) 54  Ht 5' 5.25\" (1.657 m)  Wt 131 lb (59.4 kg)  SpO2 99%  BMI 21.63 kg/m2    Her abdomen is soft.  Bowel sounds are somewhat quiet and normal.  " Nondistended abdomen.  No localized tenderness throughout.  Liver and spleen are not enlarged.     Additional Information   Current Outpatient Prescriptions   Medication Sig Dispense Refill     cephalexin (KEFLEX) 250 MG capsule Take 1 capsule (250 mg total) by mouth as needed (Take 1 tablet after intercourse). 20 capsule 3     sertraline (ZOLOFT) 50 MG tablet Take 50 mg by mouth daily.  3     No current facility-administered medications for this visit.      No Known Allergies  Social History   Substance Use Topics     Smoking status: Never Smoker     Smokeless tobacco: Never Used     Alcohol use None     Stat heme one today shows a hemoglobin of 13 and a white blood count of 8 with fairly normal differential.  Final readings are to be released later this evening.  These results are shared with Adrienne    Review and/or order of clinical lab tests:  Review and/or order of radiology tests:  Review and/or order of medicine tests:  Discussion of test results with performing physician:  Decision to obtain old records and/or obtain history from someone other than the patient:  Review and summarization of old records and/or obtaining history from someone other than the patient and.or discussion of case with another health care provider:  Independent visualization of image, tracing or specimen itself:    Time: total time spent with the patient was 15 minutes of which >50% was spent in counseling and coordination of care     Dada Gaitan MD

## 2021-06-19 NOTE — PROGRESS NOTES
Adrienne comes in today for evaluation of left knee pain.  This is been going on for approximately the last 6 months, and she localizes it to the posterior and lateral aspects of the knee.  She states that it gets worse when she flexes the knee.  She is also noticed some swelling in the back of the knee around this area as well.  She is an avid runner and has noticed pain after her runs.  However, over the last 2 months it has been limiting her mileage throughout the week.  She had been taking ibuprofen, but is now 9 weeks pregnant and thus has stopped taking it.  She has no history of knee surgeries in the past and no history of an injury receiving her symptoms.    Objective: Vital signs are as per the EMR.  In general patient is alert pleasant and in no acute distress.  She appears healthy.    Left knee: Range of motion is 0-140 .  Pain is worsened at end flexion.  No tenderness to palpation over the medial or lateral joint line.  No pain or laxity to varus or valgus stress.  Shayla's is negative.  No obvious swelling in the posterior be consistent with a Baker's cyst.  Strength is 4 out of 5 with resisted prone leg flexion on the left as compared to 5 out of 5 on the right.  This exacerbates her symptoms as well.    Assessment and plan: Left distal hamstring tendinosis.  I am going to refer her to physical therapy.  I told her that even if this was a Baker's cyst physical therapy would be first-line treatment for this as well.  If she is not improved in 4-6 weeks she can call me and we will consider advanced imaging at that time.

## 2021-06-24 NOTE — TELEPHONE ENCOUNTER
Name of caller: Patient  Phone number where you may be reached: [phone]  Reason for call: PT IS HAVING ISSUES WITH ENGORGEMENT, IT IS HELPED SOME BY PUMPING BUT NOT AS MUCH AS SHE THINKS IT SHOULD BE. SHE PUMPS UNTIL THE MILK IS DONE BUT STILL FEELS VERY FULL. PLS CALL TO DISCUSS. SHE MADE AN APPT WITH HEIDE ESTEBAN FOR [facility] TOMORROW AT 2 PM.  Best time to call back: ANY  If we don't reach you, is it okay to leave a detailed message? yes

## 2021-06-24 NOTE — TELEPHONE ENCOUNTER
"Baby is 4 days old, born on Thursday. His weight was ok after mom started supplementing with expressed milk in a bottle.     Spoke with mom over the phone; she is having issues with engorgement.     Mom had a home visit yesterday. Home nurse recommended pumping and bottle feeding due to poor latch and baby's weight loss at that time. Since then he has improved. Mom feels that pumping helps a bit with engorgement, but that she is not getting full relief from pumping.     Recommendations:   Heat before nursing or pumping, ice after. Continue ibuprofen to cut down on inflammation. Google videos of hand expression and \"reverse pressure softening\" to assist her baby with latching. Warm shower or bath just prior to latching. Also discussed doing a \"breast lift\" by lying on her back and holding breast in different areas to help excess fluid drain back into lymphatic system.     She will see Jennifer Maki for lactation appt. Tomorrow.           "

## 2021-06-24 NOTE — TELEPHONE ENCOUNTER
Called Adrienne to follow up on severe engorgement and breastfeeding difficulties.  She states that although Catarino is still very sleepy at breast and she feels he is not taking in a lot of milk from the breast, her engorgement is much improved.  They are continuing to supplement with pumped milk, and Catarino is having adequate output of both wet and soiled diapers.  He continues to appear jaundiced.  They will follow up with lactation in 5 days at with pediatrics in one week.

## 2021-07-14 PROBLEM — Z34.90 PREGNANT: Status: RESOLVED | Noted: 2019-02-21 | Resolved: 2019-12-16

## 2023-06-12 ENCOUNTER — LAB REQUISITION (OUTPATIENT)
Dept: LAB | Facility: CLINIC | Age: 38
End: 2023-06-12

## 2023-06-12 DIAGNOSIS — Z12.4 ENCOUNTER FOR SCREENING FOR MALIGNANT NEOPLASM OF CERVIX: ICD-10-CM

## 2023-06-12 PROCEDURE — G0145 SCR C/V CYTO,THINLAYER,RESCR: HCPCS | Performed by: OBSTETRICS & GYNECOLOGY

## 2023-06-14 LAB
BKR LAB AP GYN ADEQUACY: NORMAL
BKR LAB AP GYN INTERPRETATION: NORMAL
BKR LAB AP HPV REFLEX: NORMAL
BKR LAB AP LMP: NORMAL
BKR LAB AP PREVIOUS ABNL DX: NORMAL
BKR LAB AP PREVIOUS ABNORMAL: NORMAL
PATH REPORT.COMMENTS IMP SPEC: NORMAL
PATH REPORT.COMMENTS IMP SPEC: NORMAL
PATH REPORT.RELEVANT HX SPEC: NORMAL

## 2024-10-01 ENCOUNTER — LAB REQUISITION (OUTPATIENT)
Dept: LAB | Facility: CLINIC | Age: 39
End: 2024-10-01

## 2024-10-01 DIAGNOSIS — Z12.4 ENCOUNTER FOR SCREENING FOR MALIGNANT NEOPLASM OF CERVIX: ICD-10-CM

## 2024-10-01 PROCEDURE — G0145 SCR C/V CYTO,THINLAYER,RESCR: HCPCS | Performed by: OBSTETRICS & GYNECOLOGY
